# Patient Record
Sex: FEMALE | Race: BLACK OR AFRICAN AMERICAN | NOT HISPANIC OR LATINO | Employment: FULL TIME | URBAN - METROPOLITAN AREA
[De-identification: names, ages, dates, MRNs, and addresses within clinical notes are randomized per-mention and may not be internally consistent; named-entity substitution may affect disease eponyms.]

---

## 2021-10-29 ENCOUNTER — OCCUPATIONAL HEALTH (OUTPATIENT)
Dept: URGENT CARE | Facility: CLINIC | Age: 41
End: 2021-10-29

## 2021-10-29 PROCEDURE — 80305 DRUG TEST PRSMV DIR OPT OBS: CPT | Mod: S$GLB,,, | Performed by: EMERGENCY MEDICINE

## 2021-10-29 PROCEDURE — 80305 PR COLLECTION ONLY DRUG SCREEN: ICD-10-PCS | Mod: S$GLB,,, | Performed by: EMERGENCY MEDICINE

## 2023-01-19 ENCOUNTER — OFFICE VISIT (OUTPATIENT)
Dept: FAMILY MEDICINE | Facility: CLINIC | Age: 43
End: 2023-01-19
Payer: OTHER GOVERNMENT

## 2023-01-19 VITALS
WEIGHT: 117 LBS | HEIGHT: 63 IN | HEART RATE: 66 BPM | DIASTOLIC BLOOD PRESSURE: 74 MMHG | SYSTOLIC BLOOD PRESSURE: 122 MMHG | BODY MASS INDEX: 20.73 KG/M2 | OXYGEN SATURATION: 99 %

## 2023-01-19 DIAGNOSIS — L70.8 OTHER ACNE: Primary | ICD-10-CM

## 2023-01-19 DIAGNOSIS — Z12.31 SCREENING MAMMOGRAM, ENCOUNTER FOR: ICD-10-CM

## 2023-01-19 DIAGNOSIS — Z13.89 SCREENING FOR BLOOD OR PROTEIN IN URINE: ICD-10-CM

## 2023-01-19 DIAGNOSIS — Z13.6 SCREENING FOR ISCHEMIC HEART DISEASE (IHD): ICD-10-CM

## 2023-01-19 DIAGNOSIS — Z79.899 LONG-TERM USE OF HIGH-RISK MEDICATION: ICD-10-CM

## 2023-01-19 DIAGNOSIS — Z13.29 SCREENING FOR ENDOCRINE DISORDER: ICD-10-CM

## 2023-01-19 DIAGNOSIS — Z01.419 WELL FEMALE EXAM WITH ROUTINE GYNECOLOGICAL EXAM: ICD-10-CM

## 2023-01-19 PROCEDURE — 99203 OFFICE O/P NEW LOW 30 MIN: CPT | Mod: S$GLB,,, | Performed by: FAMILY MEDICINE

## 2023-01-19 PROCEDURE — 99203 PR OFFICE/OUTPT VISIT, NEW, LEVL III, 30-44 MIN: ICD-10-PCS | Mod: S$GLB,,, | Performed by: FAMILY MEDICINE

## 2023-01-19 NOTE — PROGRESS NOTES
SUBJECTIVE:    Patient ID: Viji Elder is a 42 y.o. female.    Chief Complaint: Establish Care (No Rx's/mammo ordered/flu denied/ hormonal change concerns due to acne breakout x 4 months//dp)    Pt here to establish care.     Pt has been having breakouts of acne about 3 month ago.  Has tried a lot of different topical. Has been taking otc retinol, and BHA. CeraVe    Menses are regular. The last 3 months has been having pain with ovulation. Usually doesn't cramp. Last ovulation lasted for an hour.     Walks a lot at work.   Trouble falling asleep and staying asleep.  Has been having some hot flashes about 2 months. Has not missed a cycle.   ------------------------------------------------------------------  Never mammogram  Last pap smear was about 3 years, never abnormal      No visits with results within 6 Month(s) from this visit.   Latest known visit with results is:   No results found for any previous visit.       History reviewed. No pertinent past medical history.  Social History     Socioeconomic History    Marital status: Single   Tobacco Use    Smoking status: Never     Passive exposure: Never    Smokeless tobacco: Never   Substance and Sexual Activity    Alcohol use: Not Currently    Drug use: Never    Sexual activity: Yes     Partners: Male     Birth control/protection: None     Past Surgical History:   Procedure Laterality Date     SECTION WITH TUBAL LIGATION      1994& 2010(BTL)     Family History   Problem Relation Age of Onset    Hypertension Mother     Cancer Father         prostate cancer    No Known Problems Sister     No Known Problems Brother     Eczema Daughter         2 daughters    Eczema Son        Review of patient's allergies indicates:  No Known Allergies  No current outpatient medications on file.    Review of Systems   Constitutional:  Negative for appetite change, fatigue, fever and unexpected weight change.   Respiratory:  Negative for cough, chest  "tightness, shortness of breath and wheezing.    Cardiovascular:  Negative for chest pain and leg swelling.   Gastrointestinal:  Negative for abdominal pain, constipation, nausea and vomiting.        -heartburn   Genitourinary:  Negative for difficulty urinating, dysuria, frequency and urgency.   Musculoskeletal:  Negative for arthralgias, back pain, myalgias and neck pain.   Skin:  Negative for rash.        +acne   Allergic/Immunologic: Positive for environmental allergies (takes otc antihistamine).   Neurological:  Negative for dizziness, weakness, numbness and headaches.   Hematological:  Does not bruise/bleed easily.   Psychiatric/Behavioral:  Negative for dysphoric mood, sleep disturbance and suicidal ideas. The patient is not nervous/anxious.    All other systems reviewed and are negative.       Objective:      Vitals:    01/19/23 1416   BP: 122/74   Pulse: 66   SpO2: 99%   Weight: 53.1 kg (117 lb)   Height: 5' 2.5" (1.588 m)     Physical Exam  Vitals reviewed.   Constitutional:       General: She is not in acute distress.     Appearance: Normal appearance. She is well-developed.   HENT:      Head: Normocephalic and atraumatic.   Neck:      Thyroid: No thyromegaly.   Cardiovascular:      Rate and Rhythm: Normal rate and regular rhythm.      Heart sounds: Normal heart sounds. No murmur heard.    No friction rub.   Pulmonary:      Effort: Pulmonary effort is normal.      Breath sounds: Normal breath sounds. No wheezing or rales.   Abdominal:      General: Bowel sounds are normal. There is no distension.      Palpations: Abdomen is soft.      Tenderness: There is no abdominal tenderness.   Musculoskeletal:      Cervical back: Neck supple.   Lymphadenopathy:      Cervical: No cervical adenopathy.   Skin:     General: Skin is warm and dry.      Findings: No rash.   Neurological:      Mental Status: She is alert and oriented to person, place, and time.   Psychiatric:         Attention and Perception: She is " attentive.         Speech: Speech normal.         Behavior: Behavior normal.         Thought Content: Thought content normal.         Judgment: Judgment normal.         Assessment:       1. Other acne    2. Screening mammogram, encounter for    3. Well female exam with routine gynecological exam    4. Long-term use of high-risk medication    5. Screening for blood or protein in urine    6. Screening for ischemic heart disease (IHD)    7. Screening for endocrine disorder         Plan:       Other acne  Comments:  Chronic. Will refer to Derm  Orders:  -     Ambulatory referral/consult to Dermatology; Future; Expected date: 01/26/2023    Screening mammogram, encounter for  Comments:  Mammogram order sent to [facility]  Orders:  -     Mammo Digital Screening Bilat; Future; Expected date: 01/19/2023    Well female exam with routine gynecological exam  Comments:  Will refer to GYN  Orders:  -     Ambulatory referral/consult to Obstetrics / Gynecology; Future; Expected date: 01/26/2023    Long-term use of high-risk medication  -     Lipid Panel; Future; Expected date: 01/19/2023  -     Comprehensive Metabolic Panel; Future; Expected date: 01/19/2023  -     Urinalysis, Reflex to Urine Culture Urine, Clean Catch; Future; Expected date: 01/19/2023  -     TSH w/reflex to FT4; Future; Expected date: 01/19/2023  -     CBC Auto Differential; Future; Expected date: 01/19/2023    Screening for blood or protein in urine  -     Urinalysis, Reflex to Urine Culture Urine, Clean Catch; Future; Expected date: 01/19/2023    Screening for ischemic heart disease (IHD)  -     Lipid Panel; Future; Expected date: 01/19/2023  -     Comprehensive Metabolic Panel; Future; Expected date: 01/19/2023    Screening for endocrine disorder  -     TSH w/reflex to FT4; Future; Expected date: 01/19/2023    Labs and/or tests have been ordered for the evaluation/monitoring of acute/chronic conditions, to be done just before next visit.    Follow up in about 1 year  (around 1/19/2024) for Annual.        1/19/2023 Karla Marques

## 2023-01-19 NOTE — PATIENT INSTRUCTIONS
Damir Merino MD  [address] [facility]  [facility] [place]  [place] [address]  Phone: [phone]  Fax: [phone]

## 2023-02-06 ENCOUNTER — HOSPITAL ENCOUNTER (OUTPATIENT)
Dept: RADIOLOGY | Facility: HOSPITAL | Age: 43
Discharge: HOME | End: 2023-02-06
Attending: FAMILY MEDICINE
Payer: OTHER GOVERNMENT

## 2023-02-06 DIAGNOSIS — Z12.31 SCREENING MAMMOGRAM, ENCOUNTER FOR: ICD-10-CM

## 2023-02-06 PROCEDURE — 77067 SCR MAMMO BI INCL CAD: CPT | Mod: TC,PO

## 2023-02-15 LAB
PAP RECOMMENDATION EXT: NORMAL
PAP SMEAR: NORMAL

## 2024-01-22 ENCOUNTER — OFFICE VISIT (OUTPATIENT)
Dept: FAMILY MEDICINE | Facility: CLINIC | Age: 44
End: 2024-01-22
Payer: OTHER GOVERNMENT

## 2024-01-22 VITALS
HEART RATE: 58 BPM | DIASTOLIC BLOOD PRESSURE: 60 MMHG | WEIGHT: 116 LBS | BODY MASS INDEX: 20.55 KG/M2 | SYSTOLIC BLOOD PRESSURE: 94 MMHG | HEIGHT: 63 IN

## 2024-01-22 DIAGNOSIS — Z00.00 ANNUAL PHYSICAL EXAM: Primary | ICD-10-CM

## 2024-01-22 DIAGNOSIS — M25.551 CHRONIC RIGHT HIP PAIN: ICD-10-CM

## 2024-01-22 DIAGNOSIS — Z12.11 SCREENING FOR MALIGNANT NEOPLASM OF COLON: ICD-10-CM

## 2024-01-22 DIAGNOSIS — Z12.31 OTHER SCREENING MAMMOGRAM: ICD-10-CM

## 2024-01-22 DIAGNOSIS — D17.1 LIPOMA OF TORSO: ICD-10-CM

## 2024-01-22 DIAGNOSIS — G89.29 CHRONIC RIGHT HIP PAIN: ICD-10-CM

## 2024-01-22 PROCEDURE — 99396 PREV VISIT EST AGE 40-64: CPT | Mod: S$GLB,,, | Performed by: FAMILY MEDICINE

## 2024-01-22 RX ORDER — MULTIVITAMIN
1 TABLET ORAL DAILY
COMMUNITY

## 2024-01-22 NOTE — PROGRESS NOTES
SUBJECTIVE:    Patient ID: Viji Elder is a 43 y.o. female.    Chief Complaint: Annual Exam (Annual wellness exam//no med bottles//complains of right hip pain//complains of soft lump tissue under  right shoulder blade//ref to gastroenterologist for colonoscopy made//mammogram ordered//declined flu vac//tc)    Pt here for annual exam.     Her right hip hurts every now and then when stands up a lot, more than 10 hours.  Works at a lab, for long hours. Sometimes she is able to sit. Can also be doing some bending and squatting. Also sleeps on that side most of the night as well. Hurts on the greater trochanter.       Has a  fatty tissue over her right scapula that is bothersome at times, but not to the point that she would like to do surgery.     Acne is better since last visit. Has been using retinol and a moisturizer.      Sleep is better.       ------------------------------------------------------------------  Mammogram 2023  Last pap smear was about 3 years, never abnormal        No visits with results within 6 Month(s) from this visit.   Latest known visit with results is:   No results found for any previous visit.       History reviewed. No pertinent past medical history.  Social History     Socioeconomic History    Marital status:    Tobacco Use    Smoking status: Never     Passive exposure: Never    Smokeless tobacco: Never   Substance and Sexual Activity    Alcohol use: Not Currently    Drug use: Never    Sexual activity: Yes     Partners: Male     Birth control/protection: None     Past Surgical History:   Procedure Laterality Date     SECTION WITH TUBAL LIGATION      1994& 2010(BTL)     Family History   Problem Relation Age of Onset    Hypertension Mother     Cancer Father         prostate cancer    No Known Problems Sister     No Known Problems Brother     Eczema Daughter         2 daughters    Eczema Son        Review of patient's allergies indicates:  No Known  "Allergies    Current Outpatient Medications:     multivitamin (THERAGRAN) per tablet, Take 1 tablet by mouth once daily., Disp: , Rfl:     Review of Systems   Constitutional:  Negative for appetite change, fatigue, fever and unexpected weight change.   Respiratory:  Negative for cough, chest tightness, shortness of breath and wheezing.    Cardiovascular:  Negative for chest pain and leg swelling.   Gastrointestinal:  Negative for abdominal pain, constipation, nausea and vomiting.        -heartburn   Genitourinary:  Negative for difficulty urinating, dysuria, frequency and urgency.   Musculoskeletal:  Positive for arthralgias. Negative for back pain, myalgias and neck pain.   Skin:  Negative for rash.        +acne, right scapular mass   Allergic/Immunologic: Negative for environmental allergies.   Neurological:  Negative for dizziness, weakness, numbness and headaches.   Hematological:  Does not bruise/bleed easily.   Psychiatric/Behavioral:  Negative for dysphoric mood, sleep disturbance and suicidal ideas. The patient is not nervous/anxious.    All other systems reviewed and are negative.         Objective:      Vitals:    01/22/24 0829   BP: 94/60   Pulse: (!) 58   Weight: 52.6 kg (116 lb)   Height: 5' 2.5" (1.588 m)       Physical Exam  Vitals reviewed.   Constitutional:       General: She is not in acute distress.     Appearance: Normal appearance. She is well-developed.   HENT:      Head: Normocephalic and atraumatic.   Neck:      Thyroid: No thyromegaly.   Cardiovascular:      Rate and Rhythm: Normal rate and regular rhythm.      Heart sounds: Normal heart sounds. No murmur heard.     No friction rub.   Pulmonary:      Effort: Pulmonary effort is normal.      Breath sounds: Normal breath sounds. No wheezing or rales.   Abdominal:      General: Bowel sounds are normal. There is no distension.      Palpations: Abdomen is soft.      Tenderness: There is no abdominal tenderness.   Musculoskeletal:      Cervical " back: Neck supple.   Lymphadenopathy:      Cervical: No cervical adenopathy.   Skin:     General: Skin is warm and dry.      Findings: No rash.   Neurological:      Mental Status: She is alert and oriented to person, place, and time.   Psychiatric:         Attention and Perception: She is attentive.         Speech: Speech normal.         Behavior: Behavior normal.         Thought Content: Thought content normal.         Judgment: Judgment normal.           Assessment:       1. Annual physical exam    2. Lipoma of torso    3. Screening for malignant neoplasm of colon    4. Other screening mammogram    5. Chronic right hip pain           Plan:       Annual physical exam    Lipoma of torso  Comments:  Will continue to monitor symptoms    Screening for malignant neoplasm of colon  Comments:  Referred to GI  Orders:  -     Ambulatory referral/consult to Gastroenterology; Future; Expected date: 01/29/2024    Other screening mammogram  Comments:  Order sent SMI  Orders:  -     Mammo Digital Screening Bilat w/ Matt; Future; Expected date: 01/22/2024    Chronic right hip pain  Comments:  Advised to try not to sleep on right hip. Can take ibuprofen prn      Labs and/or tests have been ordered for the evaluation/monitoring of acute/chronic conditions, to be done just before next visit.    Counseled on age and gender appropriate medical preventative services, including cancer screenings, immunizations, overall nutritional health, need for a consistent exercise regimen and an overall push towards maintaining a vigorous and active lifestyle    Follow up in about 1 year (around 1/22/2025) for Annual.        1/22/2024 Karla Marques

## 2024-02-12 ENCOUNTER — TELEPHONE (OUTPATIENT)
Dept: FAMILY MEDICINE | Facility: CLINIC | Age: 44
End: 2024-02-12
Payer: OTHER GOVERNMENT

## 2024-02-12 ENCOUNTER — HOSPITAL ENCOUNTER (OUTPATIENT)
Dept: RADIOLOGY | Facility: HOSPITAL | Age: 44
Discharge: HOME | End: 2024-02-12
Attending: FAMILY MEDICINE
Payer: OTHER GOVERNMENT

## 2024-02-12 VITALS — BODY MASS INDEX: 20.55 KG/M2 | WEIGHT: 116 LBS | HEIGHT: 63 IN

## 2024-02-12 DIAGNOSIS — Z13.6 SCREENING FOR ISCHEMIC HEART DISEASE (IHD): ICD-10-CM

## 2024-02-12 DIAGNOSIS — Z00.00 ANNUAL PHYSICAL EXAM: Primary | ICD-10-CM

## 2024-02-12 DIAGNOSIS — Z12.31 OTHER SCREENING MAMMOGRAM: ICD-10-CM

## 2024-02-12 DIAGNOSIS — Z13.89 SCREENING FOR BLOOD OR PROTEIN IN URINE: ICD-10-CM

## 2024-02-12 DIAGNOSIS — Z79.899 LONG-TERM USE OF HIGH-RISK MEDICATION: ICD-10-CM

## 2024-02-12 DIAGNOSIS — Z13.29 SCREENING FOR ENDOCRINE DISORDER: ICD-10-CM

## 2024-02-12 PROCEDURE — 77063 BREAST TOMOSYNTHESIS BI: CPT | Mod: TC,PO

## 2024-02-14 LAB
ALBUMIN SERPL-MCNC: 4.1 G/DL (ref 3.6–5.1)
ALBUMIN/GLOB SERPL: 1.4 (CALC) (ref 1–2.5)
ALP SERPL-CCNC: 48 U/L (ref 31–125)
ALT SERPL-CCNC: 10 U/L (ref 6–29)
APPEARANCE UR: CLEAR
AST SERPL-CCNC: 19 U/L (ref 10–35)
BACTERIA #/AREA URNS HPF: ABNORMAL /HPF
BACTERIA UR CULT: ABNORMAL
BACTERIA UR CULT: ABNORMAL
BASOPHILS # BLD AUTO: 39 CELLS/UL (ref 0–200)
BASOPHILS NFR BLD AUTO: 0.8 %
BILIRUB SERPL-MCNC: 0.5 MG/DL (ref 0.2–1.2)
BILIRUB UR QL STRIP: NEGATIVE
BUN SERPL-MCNC: 12 MG/DL (ref 7–25)
BUN/CREAT SERPL: NORMAL (CALC) (ref 6–22)
CALCIUM SERPL-MCNC: 9.1 MG/DL (ref 8.6–10.2)
CHLORIDE SERPL-SCNC: 105 MMOL/L (ref 98–110)
CHOLEST SERPL-MCNC: 184 MG/DL
CHOLEST/HDLC SERPL: 3.2 (CALC)
CO2 SERPL-SCNC: 27 MMOL/L (ref 20–32)
COLOR UR: YELLOW
CREAT SERPL-MCNC: 0.68 MG/DL (ref 0.5–0.99)
EGFR: 109 ML/MIN/1.73M2
EOSINOPHIL # BLD AUTO: 88 CELLS/UL (ref 15–500)
EOSINOPHIL NFR BLD AUTO: 1.8 %
ERYTHROCYTE [DISTWIDTH] IN BLOOD BY AUTOMATED COUNT: 12.3 % (ref 11–15)
GLOBULIN SER CALC-MCNC: 3 G/DL (CALC) (ref 1.9–3.7)
GLUCOSE SERPL-MCNC: 73 MG/DL (ref 65–99)
GLUCOSE UR QL STRIP: NEGATIVE
HCT VFR BLD AUTO: 37.5 % (ref 35–45)
HDLC SERPL-MCNC: 58 MG/DL
HGB BLD-MCNC: 12.6 G/DL (ref 11.7–15.5)
HGB UR QL STRIP: NEGATIVE
HYALINE CASTS #/AREA URNS LPF: ABNORMAL /LPF
KETONES UR QL STRIP: NEGATIVE
LDLC SERPL CALC-MCNC: 109 MG/DL (CALC)
LEUKOCYTE ESTERASE UR QL STRIP: ABNORMAL
LYMPHOCYTES # BLD AUTO: 1392 CELLS/UL (ref 850–3900)
LYMPHOCYTES NFR BLD AUTO: 28.4 %
MCH RBC QN AUTO: 29.4 PG (ref 27–33)
MCHC RBC AUTO-ENTMCNC: 33.6 G/DL (ref 32–36)
MCV RBC AUTO: 87.6 FL (ref 80–100)
MONOCYTES # BLD AUTO: 652 CELLS/UL (ref 200–950)
MONOCYTES NFR BLD AUTO: 13.3 %
NEUTROPHILS # BLD AUTO: 2729 CELLS/UL (ref 1500–7800)
NEUTROPHILS NFR BLD AUTO: 55.7 %
NITRITE UR QL STRIP: NEGATIVE
NONHDLC SERPL-MCNC: 126 MG/DL (CALC)
PH UR STRIP: 7 [PH] (ref 5–8)
PLATELET # BLD AUTO: 258 THOUSAND/UL (ref 140–400)
PMV BLD REES-ECKER: 11.1 FL (ref 7.5–12.5)
POTASSIUM SERPL-SCNC: 4.2 MMOL/L (ref 3.5–5.3)
PROT SERPL-MCNC: 7.1 G/DL (ref 6.1–8.1)
PROT UR QL STRIP: NEGATIVE
RBC # BLD AUTO: 4.28 MILLION/UL (ref 3.8–5.1)
RBC #/AREA URNS HPF: ABNORMAL /HPF
SERVICE CMNT-IMP: ABNORMAL
SODIUM SERPL-SCNC: 141 MMOL/L (ref 135–146)
SP GR UR STRIP: 1.01 (ref 1–1.03)
SQUAMOUS #/AREA URNS HPF: ABNORMAL /HPF
TRIGL SERPL-MCNC: 76 MG/DL
TSH SERPL-ACNC: 1.06 MIU/L
WBC # BLD AUTO: 4.9 THOUSAND/UL (ref 3.8–10.8)
WBC #/AREA URNS HPF: ABNORMAL /HPF

## 2024-02-15 ENCOUNTER — TELEPHONE (OUTPATIENT)
Dept: FAMILY MEDICINE | Facility: CLINIC | Age: 44
End: 2024-02-15
Payer: OTHER GOVERNMENT

## 2024-02-15 RX ORDER — SULFAMETHOXAZOLE AND TRIMETHOPRIM 800; 160 MG/1; MG/1
1 TABLET ORAL 2 TIMES DAILY
Qty: 6 TABLET | Refills: 0 | Status: SHIPPED | OUTPATIENT
Start: 2024-02-15 | End: 2024-02-18

## 2024-02-15 NOTE — TELEPHONE ENCOUNTER
Spoke with patient notified of UTI results per dr perez.   Patient verbalized understanding, informed to return call with questions/concerns -DN

## 2024-02-20 LAB — CRC RECOMMENDATION EXT: NORMAL

## 2024-02-23 ENCOUNTER — PATIENT OUTREACH (OUTPATIENT)
Dept: ADMINISTRATIVE | Facility: HOSPITAL | Age: 44
End: 2024-02-23
Payer: OTHER GOVERNMENT

## 2024-02-23 NOTE — PROGRESS NOTES
Population Health Chart Review & Patient Outreach Details    Outreach Performed: NO    Additional Pop Health Notes:           Updates Requested / Reviewed:      Updated Care Coordination Note, , External Sources: [facility] and [facility], Care Team Updated, and Immunizations Reconciliation Completed or Queried: [place]         Health Maintenance Topics Overdue:    Health Maintenance Due   Topic Date Due    Hepatitis C Screening  Never done    Cervical Cancer Screening  Never done    HIV Screening  Never done    Influenza Vaccine (1) Never done    COVID-19 Vaccine (3 - 2023-24 season) 09/01/2023         Health Maintenance Topic(s) Outreach Outcomes & Actions Taken:    Cervical Cancer Screening - Outreach Outcomes & Actions Taken  : External Records Uploaded & Care Team Updated if Applicable    Colorectal Cancer Screening - Outreach Outcomes & Actions Taken  : External Records Uploaded, Care Team Updated, & History Updated if Applicable

## 2024-03-25 ENCOUNTER — TELEPHONE (OUTPATIENT)
Dept: FAMILY MEDICINE | Facility: CLINIC | Age: 44
End: 2024-03-25
Payer: OTHER GOVERNMENT

## 2024-03-25 NOTE — TELEPHONE ENCOUNTER
----- Message from Georgina Hay sent at 3/25/2024 10:09 AM CDT -----  Pt has ringworm and needs to be seen asap,. Pt #[phone]

## 2024-03-27 ENCOUNTER — OFFICE VISIT (OUTPATIENT)
Dept: FAMILY MEDICINE | Facility: CLINIC | Age: 44
End: 2024-03-27
Payer: OTHER GOVERNMENT

## 2024-03-27 VITALS
HEIGHT: 63 IN | WEIGHT: 120 LBS | SYSTOLIC BLOOD PRESSURE: 98 MMHG | HEART RATE: 76 BPM | BODY MASS INDEX: 21.26 KG/M2 | DIASTOLIC BLOOD PRESSURE: 54 MMHG

## 2024-03-27 DIAGNOSIS — L30.9 DERMATITIS: Primary | ICD-10-CM

## 2024-03-27 PROCEDURE — 99212 OFFICE O/P EST SF 10 MIN: CPT | Mod: S$GLB,,, | Performed by: FAMILY MEDICINE

## 2024-03-27 RX ORDER — TRIAMCINOLONE ACETONIDE 1 MG/G
CREAM TOPICAL 2 TIMES DAILY
Qty: 80 G | Refills: 1 | Status: SHIPPED | OUTPATIENT
Start: 2024-03-27

## 2024-03-27 RX ORDER — KETOCONAZOLE 20 MG/G
CREAM TOPICAL DAILY
Qty: 80 G | Refills: 1 | Status: SHIPPED | OUTPATIENT
Start: 2024-03-27

## 2024-03-27 NOTE — PROGRESS NOTES
SUBJECTIVE:    Patient ID: Viji Elder is a 43 y.o. female.    Chief Complaint: possible ringworm (Complains of possible ringworm lower right leg for 3 weeks//no med bottles//tc)    Pt here for possible ringworm on right  lower leg.    Has tried putting clotrimazole and apple cider vinager (on a cotton ball taped with a bandaid)      It has been itching and she has been scratching it a lot.     Has had before and was treated with ketoconazole.   ------------------------------------------------------------------  Mammogram 2/2023  Last pap smear was about 3 years, never abnormal        Patient Outreach on 02/23/2024   Component Date Value Ref Range Status    CRC Recommendation External 02/20/2024 Repeat colonoscopy in 5 years   Final    PAP Recommendation External 02/15/2023 Pap in 3 years   Final    Pap 02/15/2023 Negative for intraephithelial lesion or malignancy  Negative for intraephithelial lesion or malignancy, Other Final   Telephone on 02/12/2024   Component Date Value Ref Range Status    Cholesterol 02/12/2024 184  <200 mg/dL Final    HDL 02/12/2024 58  > OR = 50 mg/dL Final    Triglycerides 02/12/2024 76  <150 mg/dL Final    LDL Cholesterol 02/12/2024 109 (H)  mg/dL (calc) Final    HDL/Cholesterol Ratio 02/12/2024 3.2  <5.0 (calc) Final    Non HDL Chol. (LDL+VLDL) 02/12/2024 126  <130 mg/dL (calc) Final    Glucose 02/12/2024 73  65 - 99 mg/dL Final    BUN 02/12/2024 12  7 - 25 mg/dL Final    Creatinine 02/12/2024 0.68  0.50 - 0.99 mg/dL Final    eGFR 02/12/2024 109  > OR = 60 mL/min/1.73m2 Final    BUN/Creatinine Ratio 02/12/2024 SEE NOTE:  6 - 22 (calc) Final    Sodium 02/12/2024 141  135 - 146 mmol/L Final    Potassium 02/12/2024 4.2  3.5 - 5.3 mmol/L Final    Chloride 02/12/2024 105  98 - 110 mmol/L Final    CO2 02/12/2024 27  20 - 32 mmol/L Final    Calcium 02/12/2024 9.1  8.6 - 10.2 mg/dL Final    Total Protein 02/12/2024 7.1  6.1 - 8.1 g/dL Final    Albumin 02/12/2024 4.1  3.6 - 5.1 g/dL  Final    Globulin, Total 02/12/2024 3.0  1.9 - 3.7 g/dL (calc) Final    Albumin/Globulin Ratio 02/12/2024 1.4  1.0 - 2.5 (calc) Final    Total Bilirubin 02/12/2024 0.5  0.2 - 1.2 mg/dL Final    Alkaline Phosphatase 02/12/2024 48  31 - 125 U/L Final    AST 02/12/2024 19  10 - 35 U/L Final    ALT 02/12/2024 10  6 - 29 U/L Final    WBC 02/12/2024 4.9  3.8 - 10.8 Thousand/uL Final    RBC 02/12/2024 4.28  3.80 - 5.10 Million/uL Final    Hemoglobin 02/12/2024 12.6  11.7 - 15.5 g/dL Final    Hematocrit 02/12/2024 37.5  35.0 - 45.0 % Final    MCV 02/12/2024 87.6  80.0 - 100.0 fL Final    MCH 02/12/2024 29.4  27.0 - 33.0 pg Final    MCHC 02/12/2024 33.6  32.0 - 36.0 g/dL Final    RDW 02/12/2024 12.3  11.0 - 15.0 % Final    Platelets 02/12/2024 258  140 - 400 Thousand/uL Final    MPV 02/12/2024 11.1  7.5 - 12.5 fL Final    Neutrophils, Abs 02/12/2024 2,729  1,500 - 7,800 cells/uL Final    Lymph # 02/12/2024 1,392  850 - 3,900 cells/uL Final    Mono # 02/12/2024 652  200 - 950 cells/uL Final    Eos # 02/12/2024 88  15 - 500 cells/uL Final    Baso # 02/12/2024 39  0 - 200 cells/uL Final    Neutrophils Relative 02/12/2024 55.7  % Final    Lymph % 02/12/2024 28.4  % Final    Mono % 02/12/2024 13.3  % Final    Eosinophil % 02/12/2024 1.8  % Final    Basophil % 02/12/2024 0.8  % Final    TSH w/reflex to FT4 02/12/2024 1.06  mIU/L Final    Color, UA 02/12/2024 YELLOW  YELLOW Final    Appearance, UA 02/12/2024 CLEAR  CLEAR Final    Specific Gravity, UA 02/12/2024 1.013  1.001 - 1.035 Final    pH, UA 02/12/2024 7.0  5.0 - 8.0 Final    Glucose, UA 02/12/2024 NEGATIVE  NEGATIVE Final    Bilirubin, UA 02/12/2024 NEGATIVE  NEGATIVE Final    Ketones, UA 02/12/2024 NEGATIVE  NEGATIVE Final    Occult Blood UA 02/12/2024 NEGATIVE  NEGATIVE Final    Protein, UA 02/12/2024 NEGATIVE  NEGATIVE Final    Nitrite, UA 02/12/2024 NEGATIVE  NEGATIVE Final    Leukocytes, UA 02/12/2024 1+ (A)  NEGATIVE Final    WBC Casts, UA 02/12/2024 10-20 (A)  < OR =  5 /HPF Final    RBC Casts, UA 2024 NONE SEEN  < OR = 2 /HPF Final    Squam Epithel, UA 2024 6-10 (A)  < OR = 5 /HPF Final    Bacteria, UA 2024 MANY (A)  NONE SEEN /HPF Final    Hyaline Casts, UA 2024 NONE SEEN  NONE SEEN /LPF Final    Service Cmt: 2024    Final    Reflexive Urine Culture 2024    Final    Urine Culture, Routine 2024  (A)   Final       Past Medical History:   Diagnosis Date    Personal history of colonic polyps 2024    Matthew Acosta MD     Social History     Socioeconomic History    Marital status:    Tobacco Use    Smoking status: Never     Passive exposure: Never    Smokeless tobacco: Never   Substance and Sexual Activity    Alcohol use: Not Currently    Drug use: Never    Sexual activity: Yes     Partners: Male     Birth control/protection: None     Past Surgical History:   Procedure Laterality Date     SECTION WITH TUBAL LIGATION      1994& 2010(BTL)    COLONOSCOPY  2024    Matthew Acosta MD     Family History   Problem Relation Age of Onset    Hypertension Mother     Cancer Father         prostate cancer    No Known Problems Sister     Eczema Daughter         2 daughters    Breast cancer Paternal Aunt     No Known Problems Brother     Eczema Son        Review of patient's allergies indicates:  No Known Allergies    Current Outpatient Medications:     multivitamin (THERAGRAN) per tablet, Take 1 tablet by mouth once daily., Disp: , Rfl:     ketoconazole (NIZORAL) 2 % cream, Apply topically once daily., Disp: 80 g, Rfl: 1    triamcinolone acetonide 0.1% (KENALOG) 0.1 % cream, Apply topically 2 (two) times daily., Disp: 80 g, Rfl: 1    Review of Systems   Constitutional:  Negative for appetite change, fatigue, fever and unexpected weight change.   Respiratory:  Negative for cough, chest tightness, shortness of breath and wheezing.    Cardiovascular:  Negative for chest pain and leg swelling.   Gastrointestinal:  Negative for  "abdominal pain, constipation, nausea and vomiting.        -heartburn   Genitourinary:  Negative for difficulty urinating, dysuria, frequency and urgency.   Musculoskeletal:  Negative for arthralgias, back pain, myalgias and neck pain.   Skin:  Positive for rash.   Allergic/Immunologic: Negative for environmental allergies.   Neurological:  Negative for dizziness, weakness, numbness and headaches.   Hematological:  Does not bruise/bleed easily.   Psychiatric/Behavioral:  Negative for dysphoric mood, sleep disturbance and suicidal ideas. The patient is not nervous/anxious.    All other systems reviewed and are negative.         Objective:      Vitals:    03/27/24 0815   BP: (!) 98/54   Pulse: 76   Weight: 54.4 kg (120 lb)   Height: 5' 2.5" (1.588 m)         Physical Exam  Vitals reviewed.   Constitutional:       General: She is not in acute distress.     Appearance: Normal appearance. She is well-developed.   HENT:      Head: Normocephalic and atraumatic.   Neck:      Thyroid: No thyromegaly.   Cardiovascular:      Rate and Rhythm: Normal rate and regular rhythm.      Heart sounds: Normal heart sounds. No murmur heard.     No friction rub.   Pulmonary:      Effort: Pulmonary effort is normal.      Breath sounds: Normal breath sounds. No wheezing or rales.   Abdominal:      General: Bowel sounds are normal. There is no distension.      Palpations: Abdomen is soft.      Tenderness: There is no abdominal tenderness.   Musculoskeletal:      Cervical back: Neck supple.   Lymphadenopathy:      Cervical: No cervical adenopathy.   Skin:     General: Skin is warm and dry.      Findings: Rash present. Rash is macular and scaling.          Neurological:      Mental Status: She is alert and oriented to person, place, and time.   Psychiatric:         Attention and Perception: She is attentive.         Speech: Speech normal.         Behavior: Behavior normal.         Thought Content: Thought content normal.         Judgment: " Judgment normal.           Assessment:       1. Dermatitis             Plan:       Dermatitis  Comments:  Acute. Will continue to monitor symptoms.  Orders:  -     ketoconazole (NIZORAL) 2 % cream; Apply topically once daily.  Dispense: 80 g; Refill: 1  -     triamcinolone acetonide 0.1% (KENALOG) 0.1 % cream; Apply topically 2 (two) times daily.  Dispense: 80 g; Refill: 1          Follow up if symptoms worsen or fail to improve, for As scheduled.        3/27/2024 Karla Marques

## 2024-05-09 ENCOUNTER — TELEPHONE (OUTPATIENT)
Dept: FAMILY MEDICINE | Facility: CLINIC | Age: 44
End: 2024-05-09
Payer: OTHER GOVERNMENT

## 2024-05-09 ENCOUNTER — PATIENT MESSAGE (OUTPATIENT)
Dept: FAMILY MEDICINE | Facility: CLINIC | Age: 44
End: 2024-05-09
Payer: OTHER GOVERNMENT

## 2024-05-09 DIAGNOSIS — L30.9 DERMATITIS: Primary | ICD-10-CM

## 2024-05-09 NOTE — TELEPHONE ENCOUNTER
Let pt know that I do not think that is ringworm. She may need to see Derm about this lesion.  Can send to Dr. Weil.

## 2024-05-09 NOTE — TELEPHONE ENCOUNTER
----- Message from Ciera Morris sent at 5/9/2024 11:23 AM CDT -----  Pt would like to be seen tomorrow due to wing worm on her leg that has been there for a minute. Pt was prescribed cream before but not working for pt.   [phone]

## 2024-05-09 NOTE — TELEPHONE ENCOUNTER
----- Message from Carin Alcala sent at 5/9/2024 12:52 PM CDT -----  The patient wants to make sure you received the image on her portal.  Please call pt's # [phone]

## 2024-07-23 ENCOUNTER — TELEPHONE (OUTPATIENT)
Dept: FAMILY MEDICINE | Facility: CLINIC | Age: 44
End: 2024-07-23
Payer: OTHER GOVERNMENT

## 2024-07-23 NOTE — TELEPHONE ENCOUNTER
----- Message from Ciera Morris sent at 7/23/2024 11:33 AM CDT -----  Pt was trying to get an appointment this week due to having a UTI.   [phone]

## 2024-07-23 NOTE — TELEPHONE ENCOUNTER
Spoke with pt advised no appts, but sen schedule her for [place]. Pt states she is not able to get off work now, but can come tomorrow.

## 2024-07-24 ENCOUNTER — CLINICAL SUPPORT (OUTPATIENT)
Dept: FAMILY MEDICINE | Facility: CLINIC | Age: 44
End: 2024-07-24
Payer: OTHER GOVERNMENT

## 2024-07-24 DIAGNOSIS — R39.9 UTI SYMPTOMS: Primary | ICD-10-CM

## 2024-07-24 LAB
BILIRUB UR QL STRIP: NEGATIVE
GLUCOSE UR QL STRIP: NEGATIVE
KETONES UR QL STRIP: NEGATIVE
LEUKOCYTE ESTERASE UR QL STRIP: NEGATIVE
PH, POC UA: 6
POC BLOOD, URINE: NEGATIVE
POC NITRATES, URINE: NEGATIVE
PROT UR QL STRIP: NEGATIVE
SP GR UR STRIP: 1.01 (ref 1–1.03)
UROBILINOGEN UR STRIP-ACNC: NORMAL (ref 0.1–1.1)

## 2024-07-24 PROCEDURE — 81003 URINALYSIS AUTO W/O SCOPE: CPT | Mod: QW,S$GLB,, | Performed by: FAMILY MEDICINE

## 2024-07-27 LAB — BACTERIA UR CULT: NORMAL

## 2024-07-29 ENCOUNTER — TELEPHONE (OUTPATIENT)
Dept: FAMILY MEDICINE | Facility: CLINIC | Age: 44
End: 2024-07-29
Payer: OTHER GOVERNMENT

## 2024-07-29 NOTE — TELEPHONE ENCOUNTER
----- Message from Karla Marques MD sent at 7/28/2024  1:39 PM CDT -----  UA NO UTI- Let pt know that urinalysis showed no evidence of an infection.  Her urine culture showed no growth.

## 2024-07-29 NOTE — TELEPHONE ENCOUNTER
Spoke to patient with result verbatim per Dr Marques. Verbalized understanding and said she saw these results on the portal.

## 2024-08-21 ENCOUNTER — OFFICE VISIT (OUTPATIENT)
Dept: URGENT CARE | Facility: CLINIC | Age: 44
End: 2024-08-21
Payer: OTHER GOVERNMENT

## 2024-08-21 VITALS
RESPIRATION RATE: 18 BRPM | HEIGHT: 63 IN | SYSTOLIC BLOOD PRESSURE: 105 MMHG | OXYGEN SATURATION: 99 % | DIASTOLIC BLOOD PRESSURE: 67 MMHG | WEIGHT: 110 LBS | BODY MASS INDEX: 19.49 KG/M2 | TEMPERATURE: 98 F | HEART RATE: 67 BPM

## 2024-08-21 DIAGNOSIS — N39.0 URINARY TRACT INFECTION WITHOUT HEMATURIA, SITE UNSPECIFIED: Primary | ICD-10-CM

## 2024-08-21 LAB
B-HCG UR QL: NEGATIVE
BILIRUB UR QL STRIP: NEGATIVE
CTP QC/QA: YES
GLUCOSE UR QL STRIP: NEGATIVE
KETONES UR QL STRIP: NEGATIVE
LEUKOCYTE ESTERASE UR QL STRIP: POSITIVE
PH, POC UA: 6
POC BLOOD, URINE: NEGATIVE
POC NITRATES, URINE: NEGATIVE
PROT UR QL STRIP: NEGATIVE
SP GR UR STRIP: 1.01 (ref 1–1.03)
UROBILINOGEN UR STRIP-ACNC: 0.2 (ref 0.1–1.1)

## 2024-08-21 PROCEDURE — 81025 URINE PREGNANCY TEST: CPT | Mod: S$GLB,,,

## 2024-08-21 PROCEDURE — 99204 OFFICE O/P NEW MOD 45 MIN: CPT | Mod: S$GLB,,,

## 2024-08-21 PROCEDURE — 81003 URINALYSIS AUTO W/O SCOPE: CPT | Mod: QW,S$GLB,,

## 2024-08-21 RX ORDER — NITROFURANTOIN 25; 75 MG/1; MG/1
100 CAPSULE ORAL 2 TIMES DAILY
Qty: 10 CAPSULE | Refills: 0 | Status: SHIPPED | OUTPATIENT
Start: 2024-08-21 | End: 2024-08-26

## 2024-08-21 NOTE — PROGRESS NOTES
"Subjective:      Patient ID: Viji Elder is a 44 y.o. female.    Vitals:  height is 5' 3" (1.6 m) and weight is 49.9 kg (110 lb). Her temperature is 98.2 °F (36.8 °C). Her blood pressure is 105/67 and her pulse is 67. Her respiration is 18 and oxygen saturation is 99%.     Chief Complaint: Urinary Tract Infection    In clinic with a chief complaint of urinary urgency, frequency, dysuria, and pelvic pain.  Symptoms been ongoing and waxing and waning for a month.  He was seen by her primary care doctor end of July and had a negative urine culture.  She denies being placed on antibiotics.  Also endorses no improvement in symptoms since then.  Denies vaginal discharge, or lesions.  She was tried cranberry, and zinc with no improvement.  She denies recurrent or frequent UTIs.  Prior to onset she was vacationing, and swimming.  But denies staying in wet swim suit bottoms.  She also reports previous UTI earlier in the year    Urinary Tract Infection   This is a new problem. The current episode started in the past 7 days. The problem occurs every urination. The problem has been gradually worsening. The quality of the pain is described as aching and burning. The pain is moderate. She is Not sexually active. There is No history of pyelonephritis. Associated symptoms include frequency and urgency. Pertinent negatives include no flank pain, nausea, vomiting or constipation. Associated symptoms comments: Abdominal pain. She has tried increased fluids for the symptoms. There is no history of diabetes insipidus, diabetes mellitus, kidney stones, recurrent UTIs or STD.       Constitution: Negative for fever.   HENT: Negative.     Neck: neck negative.   Cardiovascular: Negative.    Respiratory: Negative.     Gastrointestinal:  Negative for abdominal pain, nausea, vomiting, constipation and diarrhea.   Endocrine: negative.   Genitourinary:  Positive for dysuria, frequency, urgency and pelvic pain. Negative for flank pain. "   Musculoskeletal: Negative.    Skin: Negative.    Neurological: Negative.    Hematologic/Lymphatic: Negative.    Psychiatric/Behavioral: Negative.        Objective:     Physical Exam   Constitutional: She is oriented to person, place, and time. She is cooperative.  Non-toxic appearance. She does not appear ill. No distress.   HENT:   Head: Normocephalic and atraumatic.   Ears:   Right Ear: External ear normal.   Left Ear: External ear normal.   Nose: Nose normal.   Mouth/Throat: Uvula is midline, oropharynx is clear and moist and mucous membranes are normal. Mucous membranes are moist. Oropharynx is clear.   Eyes: Conjunctivae and lids are normal. Pupils are equal, round, and reactive to light. Extraocular movement intact   Neck: Trachea normal and phonation normal. Neck supple.   Cardiovascular: Normal rate, regular rhythm, normal heart sounds and normal pulses.   Pulmonary/Chest: Effort normal and breath sounds normal.   Abdominal: Normal appearance. Soft. flat abdomen There is no abdominal tenderness. There is no left CVA tenderness and no right CVA tenderness.   Musculoskeletal: Normal range of motion.         General: Normal range of motion.      Right lower leg: No edema.      Left lower leg: No edema.   Neurological: no focal deficit. She is alert, oriented to person, place, and time and at baseline. She has normal motor skills, normal sensation and intact cranial nerves (2-12). Gait and coordination normal. GCS eye subscore is 4. GCS verbal subscore is 5. GCS motor subscore is 6.   Skin: Skin is warm, dry and not diaphoretic. Capillary refill takes 2 to 3 seconds.   Psychiatric: She experiences Normal attention and Normal perception. Her speech is normal and behavior is normal. Mood, judgment and thought content normal.   Nursing note and vitals reviewed.      Assessment:     1. Urinary tract infection without hematuria, site unspecified        Plan:       Urinary tract infection without hematuria, site  unspecified  -     POCT urine pregnancy  -     POCT Urinalysis, Dipstick, Manual, W/O Scope  -     nitrofurantoin, macrocrystal-monohydrate, (MACROBID) 100 MG capsule; Take 1 capsule (100 mg total) by mouth 2 (two) times daily. for 5 days  Dispense: 10 capsule; Refill: 0  -     Urine culture      Trace leukocytes.  Symptoms have been ongoing for a month with no improvement.  Given duration of symptoms with no significant pyuria I have low suspicion of actual UTI, will , confirm with culture.  Suspect some degree of gyn problem, but she denies symptoms.  Previous cultures also reviewed.  Most recent when at onset of symptoms was negative.  Did have a positive culture in February 24 at PCP's office.

## 2024-08-21 NOTE — PATIENT INSTRUCTIONS
DETAILED PLAN: .   Preventative DAILY supplements:  Recommend Buying a Cranberry Supplement that has 36mg PAC (only a few have this much) of cranberry - it is a very specific dose but many companies sell it.   Preferred: Utiva Cranberry Tablets (Utiva Cranberry PACs Supplement Pills $39.99 for 30 pills)- their particular tablet is the equivalent of 9 cranberry tablets that you buy over the counter. (phone 1-313.940.4030 or online https://www.[employer]/products/utiva-uti-control)  Uriexo Cranberry Tablets   [employer] $30/month: https://[employer]/products/cranberex-urinary-health-support  If unable to afford- can use over the counter cranberry caplets but will need to take 1500mg daily (about 3 capsules, 3x a day)   Ok to Buy Over the Counter at [employer], [employer] (ask pharmacist for help) or buy from [employer] (see above)  Probiotic with lactobacillus - take once a day. This helps populate the vagina with good bacteria instead of bad bacteria- you can buy this over the counter or buy from the company [employer]. Make sure to look for LACTOBACILLUS on the bottle.   D-mannose supplement (2000mg a day)  $20/30d supply  This helps keep the bad bacteria from sticking to your bladder wall. You can buy this over the counter or buy from [employer] by visiting [employer].      UTI prevention recommendations  Drink more water (2 to 4 bottles) to dilute the bacteria that are replicating. This will also help you urinate more often if you tend to hold your bladder.   Timed voiding (which means- Urinate every 2 hours during the day) to rid the bladder of bacteria before they multiply and start to stick to your bladder walls and cause more symptoms and problems  Avoid pads if possible or wear thinner pads and change them more often     Other helpful information:     If you have a UTI try to self treat first for 1-2 days with conservative treatment:  Increase fluid intake - drink 4 to 5 bottles  "of water a day and empty bladder often  AZO for burning or urinary frequency (over the counter)  Utiva cranberry tablets when having uti symptoms: Take 2x a day for 2 days then daily for a week (buy from [employer]). Visit https://www.[facility].[employer] or call 1-785.665.1477 to order. They costs about $30/month and offer a subscribe and save option. They also have a probiotic and D mannose supplementation, if the patient is able to afford, I suggest taking. Utiva cranberry tablets only available from [employer] are equivalent to the suggested dose of 9 tablets if you buy over the counter.   D mannose 2000mx a day for 2 days then daily for a week (can buy from [employer] or over the counter)     If your symptoms persists despite increased water intake and azo then:  see pcp, gynecologist, or urgent care and make sure to give a clean catch urine or catheterized urine. This means wipe, urinate in the toilet, then provide a MID STREAM sample (your hand should get urine on it if you are doing it right). This avoids urine picking up the normal bacteria that line the vagina on the way out to the cup.   ask for a URINE CULTURE. You need to make sure you know what antibiotics will kill your particular bacteria  if you are told you have "multiple organisms" or "normal vaginal drew" it means the urine sample picked up the normal bacteria in the vagina and contaminated your urine specimen. If you are still having symptoms of a UTI then you will need to provide ANOTHER clean catch sample or CATHETERIZED urine to bypass the vagina and get urine directly from the bladder:      If you are given antibiotics from primary care, ER, urgent care or gynecologist:  only take 3 to 5 days of the antibiotics (unless you have diabetes or a urologist tells you take take more), even if they give you a 10d supply and keep or discard the rest  only take the full 10 days if you are having fever, chills or pain in your kidneys      Things to " "remember:   Try to avoid antibiotics or at least try to avoid taking them for more than 3 to 5 days for simple uti.    Bacteria are smart and they will become resistant to antibiotics. We have only a limited amount of antibiotics that work.   ALWAYS request a urine culture so you can know which antibiotics work.   If you ever see bloody red urine call us ASAP, even with uti's and even if you are on a blood thinner, this can sometimes be a sign of bladder cancer or kidney stones.      If you do have uti symptoms but do not have a culture proven uti (with E.coli, for example)  You may need a catheterized urine if it says "multiple organisms" which means normal vaginal drew  You may have a kidney stone, interstitial cystitis, overactive bladder, bladder cancer, so please call to make a follow-up      "

## 2024-10-11 ENCOUNTER — OFFICE VISIT (OUTPATIENT)
Dept: URGENT CARE | Facility: CLINIC | Age: 44
End: 2024-10-11
Payer: OTHER GOVERNMENT

## 2024-10-11 VITALS
BODY MASS INDEX: 20.38 KG/M2 | HEIGHT: 63 IN | OXYGEN SATURATION: 100 % | WEIGHT: 115 LBS | TEMPERATURE: 99 F | HEART RATE: 58 BPM | DIASTOLIC BLOOD PRESSURE: 77 MMHG | RESPIRATION RATE: 16 BRPM | SYSTOLIC BLOOD PRESSURE: 128 MMHG

## 2024-10-11 DIAGNOSIS — J01.00 ACUTE NON-RECURRENT MAXILLARY SINUSITIS: ICD-10-CM

## 2024-10-11 DIAGNOSIS — H66.92 ACUTE OTITIS MEDIA, LEFT: Primary | ICD-10-CM

## 2024-10-11 RX ORDER — FLUTICASONE PROPIONATE 50 MCG
2 SPRAY, SUSPENSION (ML) NASAL DAILY
Qty: 15.8 ML | Refills: 0 | Status: SHIPPED | OUTPATIENT
Start: 2024-10-11

## 2024-10-11 RX ORDER — LORATADINE PSEUDOEPHEDRINE SULFATE 10; 240 MG/1; MG/1
1 TABLET, EXTENDED RELEASE ORAL DAILY
COMMUNITY
Start: 2024-10-11 | End: 2024-10-21

## 2024-10-11 RX ORDER — AMOXICILLIN AND CLAVULANATE POTASSIUM 875; 125 MG/1; MG/1
1 TABLET, FILM COATED ORAL EVERY 12 HOURS
Qty: 20 TABLET | Refills: 0 | Status: SHIPPED | OUTPATIENT
Start: 2024-10-11 | End: 2024-10-21

## 2024-10-11 NOTE — PROGRESS NOTES
"Subjective:      Patient ID: Viji Elder is a 44 y.o. female.    Vitals:  height is 5' 3" (1.6 m) and weight is 52.2 kg (115 lb). Her oral temperature is 98.8 °F (37.1 °C). Her blood pressure is 128/77 and her pulse is 58 (abnormal). Her respiration is 16 and oxygen saturation is 100%.     Chief Complaint: Otalgia    Patient is a 44-year-old female who presents to clinic for evaluation of ear pain.  Patient reports symptoms began yesterday.  Patient reports she is vaccinated.  Patient reports no recent or known sick exposures.  Patient reports no over-the-counter medications for symptoms at this point.  Patient states having left ear pain.  Patient reports fullness type sensation.  Patient states no drainage from ear, ringing in the ear or any hearing loss.  Patient states has had associated symptoms of nasal sinus congestion with sinus pressure.  Patient states that she has not had any headaches or dizziness, sore throat, shortness of breath or cough, chest pain or palpitations, abdominal pain, nausea or vomiting, diarrhea, urinary symptoms, body aches, rash, fever or chills.    Otalgia   There is pain in the left ear. This is a new problem. The current episode started yesterday. The problem has been gradually worsening. Pertinent negatives include no abdominal pain, coughing, diarrhea, ear discharge, headaches, hearing loss, rash, sore throat or vomiting.       Constitution: Negative. Negative for chills, sweating, fatigue and fever.   HENT:  Positive for ear pain (Left ear fullness), congestion and sinus pressure. Negative for ear discharge, tinnitus, hearing loss and sore throat.    Neck: neck negative.   Cardiovascular: Negative.  Negative for chest pain and palpitations.   Eyes: Negative.    Respiratory: Negative.  Negative for chest tightness, cough and shortness of breath.    Gastrointestinal: Negative.  Negative for abdominal pain, nausea, vomiting and diarrhea.   Endocrine: negative. "   Genitourinary: Negative.  Negative for dysuria, frequency and urgency.   Musculoskeletal: Negative.    Skin: Negative.  Negative for color change, pale, rash and erythema.   Allergic/Immunologic: Negative.    Neurological: Negative.  Negative for dizziness, light-headedness, passing out, headaches, disorientation and altered mental status.   Hematologic/Lymphatic: Negative.    Psychiatric/Behavioral: Negative.  Negative for altered mental status, disorientation and confusion.       Objective:     Physical Exam   Constitutional: She is oriented to person, place, and time. She appears well-developed. She is cooperative.  Non-toxic appearance. She does not appear ill. No distress.   HENT:   Head: Normocephalic and atraumatic.   Ears:   Right Ear: Hearing, tympanic membrane, external ear and ear canal normal.   Left Ear: Hearing, external ear and ear canal normal. No no drainage, swelling or tenderness. Tympanic membrane is erythematous and bulging. Tympanic membrane is not perforated. No decreased hearing is noted. no impacted cerumen  Nose: Congestion present. No mucosal edema, rhinorrhea or nasal deformity. No epistaxis. Right sinus exhibits maxillary sinus tenderness. Right sinus exhibits no frontal sinus tenderness. Left sinus exhibits maxillary sinus tenderness. Left sinus exhibits no frontal sinus tenderness.   Mouth/Throat: Uvula is midline, oropharynx is clear and moist and mucous membranes are normal. Mucous membranes are moist. No trismus in the jaw. Normal dentition. No uvula swelling. No oropharyngeal exudate or posterior oropharyngeal erythema. Oropharynx is clear.   Eyes: Conjunctivae and lids are normal. Pupils are equal, round, and reactive to light. Right eye exhibits no discharge. Left eye exhibits no discharge. No scleral icterus.   Neck: Trachea normal and phonation normal. Neck supple. No neck rigidity present.   Cardiovascular: Regular rhythm, normal heart sounds and normal pulses. Bradycardia  present.   Pulmonary/Chest: Effort normal and breath sounds normal. No respiratory distress. She has no wheezes. She has no rhonchi. She has no rales.   Abdominal: Normal appearance and bowel sounds are normal. She exhibits no distension. Soft. There is no abdominal tenderness.   Musculoskeletal: Normal range of motion.         General: Normal range of motion.      Cervical back: She exhibits no tenderness.   Lymphadenopathy:     She has no cervical adenopathy.   Neurological: She is alert and oriented to person, place, and time. She exhibits normal muscle tone.   Skin: Skin is warm, dry, intact, not diaphoretic, not pale and no rash. Capillary refill takes less than 2 seconds. No erythema   Psychiatric: Her speech is normal and behavior is normal. Judgment and thought content normal.   Nursing note and vitals reviewed.      Assessment:     1. Acute otitis media, left    2. Acute non-recurrent maxillary sinusitis        Plan:       Acute otitis media, left    Acute non-recurrent maxillary sinusitis    Other orders  -     amoxicillin-clavulanate 875-125mg (AUGMENTIN) 875-125 mg per tablet; Take 1 tablet by mouth every 12 (twelve) hours. for 10 days  Dispense: 20 tablet; Refill: 0  -     loratadine-pseudoephedrine  mg (CLARITIN-D 24 HOUR)  mg per 24 hr tablet; Take 1 tablet by mouth once daily. for 10 days  -     fluticasone propionate (FLONASE) 50 mcg/actuation nasal spray; 2 sprays (100 mcg total) by Each Nostril route once daily.  Dispense: 15.8 mL; Refill: 0                Take medications as prescribed.    Tylenol/Motrin per package instructions for any pain or fever.    Nasal saline flushes or irrigation as directed.    Assure adequate hydration.    Follow-up with PCP in 1-2 days.    Return to clinic as needed.    To ED for any new or acutely worsening symptoms.    Patient in agreement with plan of care.    DISCLAIMER: Please note that my documentation in this Electronic Healthcare Record was  produced using speech recognition software and therefore may contain errors related to that software system.These could include grammar, punctuation and spelling errors or the inclusion/exclusion of phrases that were not intended. Garbled syntax, mangled pronouns, and other bizarre constructions may be attributed to that software system.

## 2025-01-11 NOTE — PROGRESS NOTES
SUBJECTIVE:    Patient ID: Viji Elder is a 44 y.o. female.    Chief Complaint: Annual Exam (Annual exam//no med bottles//mammogram ordered for after 2/13/25//declined flu vac//patient has been fasting for over 12 hours and would like labs ordered//tc)    Pt here for annual exam.       Her right hip hurts every now and then when stands up a lot, more than 10 hours.  Works at a lab, for long hours. Hurts on the greater trochanter.     Allergies are doing ok right now. Has not been using flonase.     Has a  fatty tissue over her right scapula that is bothersome at times, but not to the point that she would like to do surgery.     Acne is about the same. Has been dealing with it. Thinks it is hormonal as it mostly in the jaw are.   Has been having occasional hot flashes, doesn't keep her up at night.     Denies any issues with constipation or diarrhea.    Not having any bladder issues.     Sleep is good.     Denies heartburn.      ------------------------------------------------------------------  Mammogram 1/2024  Pap 2024 (Darinel)  Refuse flu vaccine.   Cscope 2/2024, 1 polyp, (tSuaud) to repeat in 5 years.         Office Visit on 08/21/2024   Component Date Value Ref Range Status    POC Preg Test, Ur 08/21/2024 Negative  Negative Final     Acceptable 08/21/2024 Yes   Final    POC Blood, Urine 08/21/2024 Negative  Negative Final    POC Bilirubin, Urine 08/21/2024 Negative  Negative Final    POC Urobilinogen, Urine 08/21/2024 0.2  0.1 - 1.1 Final    POC Ketones, Urine 08/21/2024 Negative  Negative Final    POC Protein, Urine 08/21/2024 Negative  Negative Final    POC Nitrates, Urine 08/21/2024 Negative  Negative Final    POC Glucose, Urine 08/21/2024 Negative  Negative Final    pH, UA 08/21/2024 6.0   Final    POC Specific Gravity, Urine 08/21/2024 1.015  1.003 - 1.029 Final    POC Leukocytes, Urine 08/21/2024 Positive (A)  Negative Final    Urine Culture, Routine 08/21/2024 Final report  (A)   Final    Result 1 2024 Comment (A)   Final    Antimicrobial Susceptibility 2024 Comment   Final   Clinical Support on 2024   Component Date Value Ref Range Status    POC Blood, Urine 2024 Negative  Negative Final    POC Bilirubin, Urine 2024 Negative  Negative Final    POC Urobilinogen, Urine 2024 Normal  0.1 - 1.1 Final    POC Ketones, Urine 2024 Negative  Negative Final    POC Protein, Urine 2024 Negative  Negative Final    POC Nitrates, Urine 2024 Negative  Negative Final    POC Glucose, Urine 2024 Negative  Negative Final    pH, UA 2024 6.0   Final    POC Specific Gravity, Urine 2024 1.015  1.003 - 1.029 Final    POC Leukocytes, Urine 2024 Negative  Negative Final    Urine Culture, Routine 2024 SEE COMMENT   Final       Past Medical History:   Diagnosis Date    Personal history of colonic polyps 2024    Matthew Acosta MD     Social History     Socioeconomic History    Marital status:    Tobacco Use    Smoking status: Never     Passive exposure: Never    Smokeless tobacco: Never   Substance and Sexual Activity    Alcohol use: Not Currently    Drug use: Never    Sexual activity: Yes     Partners: Male     Birth control/protection: None     Past Surgical History:   Procedure Laterality Date     SECTION WITH TUBAL LIGATION      1994& 2010(BTL)    COLONOSCOPY  2024    Matthew Acosta MD     Family History   Problem Relation Name Age of Onset    Hypertension Mother      Cancer Father          prostate cancer    No Known Problems Sister      Eczema Daughter          2 daughters    Breast cancer Paternal Aunt      No Known Problems Brother      Eczema Son         Review of patient's allergies indicates:  No Known Allergies    Current Outpatient Medications:     fluticasone propionate (FLONASE) 50 mcg/actuation nasal spray, 2 sprays (100 mcg total) by Each Nostril route once daily., Disp: 15.8 mL, Rfl:  "0    multivitamin (THERAGRAN) per tablet, Take 1 tablet by mouth once daily., Disp: , Rfl:     multivitamin with minerals tablet, Take 1 tablet by mouth once daily., Disp: , Rfl:     Review of Systems   Constitutional:  Negative for appetite change, fatigue, fever and unexpected weight change.   Respiratory:  Negative for cough, chest tightness, shortness of breath and wheezing.    Cardiovascular:  Negative for chest pain and leg swelling.   Gastrointestinal:  Negative for abdominal pain, constipation, nausea and vomiting.        -heartburn   Genitourinary:  Negative for difficulty urinating, dysuria, frequency and urgency.   Musculoskeletal:  Positive for arthralgias. Negative for back pain, myalgias and neck pain.   Skin:  Negative for rash.        +acne, right scapular mass   Allergic/Immunologic: Negative for environmental allergies.   Neurological:  Negative for dizziness, weakness, numbness and headaches.   Hematological:  Does not bruise/bleed easily.   Psychiatric/Behavioral:  Negative for dysphoric mood, sleep disturbance and suicidal ideas. The patient is not nervous/anxious.    All other systems reviewed and are negative.         Objective:      Vitals:    01/13/25 0827   BP: 96/60   Temp: (!) 60 °F (15.6 °C)   SpO2: 100%   Weight: 54.4 kg (120 lb)   Height: 5' 3" (1.6 m)         Physical Exam  Vitals reviewed.   Constitutional:       General: She is not in acute distress.     Appearance: Normal appearance. She is well-developed.   HENT:      Head: Normocephalic and atraumatic.   Neck:      Thyroid: No thyromegaly.   Cardiovascular:      Rate and Rhythm: Normal rate and regular rhythm.      Heart sounds: Normal heart sounds. No murmur heard.     No friction rub.   Pulmonary:      Effort: Pulmonary effort is normal.      Breath sounds: Normal breath sounds. No wheezing or rales.   Abdominal:      General: Bowel sounds are normal. There is no distension.      Palpations: Abdomen is soft.      Tenderness: " There is no abdominal tenderness.   Musculoskeletal:      Cervical back: Neck supple.   Lymphadenopathy:      Cervical: No cervical adenopathy.   Skin:     General: Skin is warm and dry.      Findings: No rash.          Neurological:      Mental Status: She is alert and oriented to person, place, and time.   Psychiatric:         Attention and Perception: She is attentive.         Speech: Speech normal.         Behavior: Behavior normal.         Thought Content: Thought content normal.         Judgment: Judgment normal.           Assessment:       1. Annual physical exam    2. Lipoma of torso    3. Long-term use of high-risk medication    4. Other screening mammogram             Plan:       Annual physical exam    Lipoma of torso  Comments:  Stable. Will continue to monitor    Long-term use of high-risk medication  -     TSH w/reflex to FT4; Future; Expected date: 01/13/2025  -     Urinalysis, Reflex to Urine Culture Urine, Clean Catch; Future; Expected date: 01/13/2025  -     CBC Auto Differential; Future; Expected date: 01/13/2025  -     Lipid Panel; Future; Expected date: 01/13/2025  -     Comprehensive Metabolic Panel; Future; Expected date: 01/13/2025    Other screening mammogram  -     Mammo Digital Screening Bilat w/ Matt; Future; Expected date: 02/13/2025        Labs and/or tests have been ordered for the evaluation/monitoring of acute/chronic conditions, to be done just before next visit.    Counseled on age and gender appropriate medical preventative services, including cancer screenings, immunizations, overall nutritional health, need for a consistent exercise regimen and an overall push towards maintaining a vigorous and active lifestyle    Follow up in about 1 year (around 1/13/2026) for Annual, LABS.        1/13/2025 Karla Marques

## 2025-01-13 ENCOUNTER — OFFICE VISIT (OUTPATIENT)
Dept: FAMILY MEDICINE | Facility: CLINIC | Age: 45
End: 2025-01-13
Payer: OTHER GOVERNMENT

## 2025-01-13 VITALS
DIASTOLIC BLOOD PRESSURE: 60 MMHG | BODY MASS INDEX: 21.26 KG/M2 | SYSTOLIC BLOOD PRESSURE: 96 MMHG | OXYGEN SATURATION: 100 % | WEIGHT: 120 LBS | HEIGHT: 63 IN | TEMPERATURE: 60 F

## 2025-01-13 DIAGNOSIS — Z79.899 LONG-TERM USE OF HIGH-RISK MEDICATION: ICD-10-CM

## 2025-01-13 DIAGNOSIS — D17.1 LIPOMA OF TORSO: ICD-10-CM

## 2025-01-13 DIAGNOSIS — Z00.00 ANNUAL PHYSICAL EXAM: Primary | ICD-10-CM

## 2025-01-13 DIAGNOSIS — Z12.31 OTHER SCREENING MAMMOGRAM: ICD-10-CM

## 2025-01-13 PROCEDURE — 99396 PREV VISIT EST AGE 40-64: CPT | Mod: S$GLB,,, | Performed by: FAMILY MEDICINE

## 2025-01-14 LAB
ALBUMIN SERPL-MCNC: 4.2 G/DL (ref 3.6–5.1)
ALBUMIN/GLOB SERPL: 1.6 (CALC) (ref 1–2.5)
ALP SERPL-CCNC: 53 U/L (ref 31–125)
ALT SERPL-CCNC: 10 U/L (ref 6–29)
APPEARANCE UR: CLEAR
AST SERPL-CCNC: 17 U/L (ref 10–35)
BACTERIA #/AREA URNS HPF: ABNORMAL /HPF
BACTERIA UR CULT: ABNORMAL
BASOPHILS # BLD AUTO: 49 CELLS/UL (ref 0–200)
BASOPHILS NFR BLD AUTO: 0.7 %
BILIRUB SERPL-MCNC: 0.5 MG/DL (ref 0.2–1.2)
BILIRUB UR QL STRIP: NEGATIVE
BUN SERPL-MCNC: 14 MG/DL (ref 7–25)
BUN/CREAT SERPL: NORMAL (CALC) (ref 6–22)
CALCIUM SERPL-MCNC: 9 MG/DL (ref 8.6–10.2)
CHLORIDE SERPL-SCNC: 105 MMOL/L (ref 98–110)
CHOLEST SERPL-MCNC: 188 MG/DL
CHOLEST/HDLC SERPL: 2.8 (CALC)
CO2 SERPL-SCNC: 29 MMOL/L (ref 20–32)
COLOR UR: YELLOW
CREAT SERPL-MCNC: 0.66 MG/DL (ref 0.5–0.99)
EGFR: 109 ML/MIN/1.73M2
EOSINOPHIL # BLD AUTO: 98 CELLS/UL (ref 15–500)
EOSINOPHIL NFR BLD AUTO: 1.4 %
ERYTHROCYTE [DISTWIDTH] IN BLOOD BY AUTOMATED COUNT: 12.4 % (ref 11–15)
GLOBULIN SER CALC-MCNC: 2.6 G/DL (CALC) (ref 1.9–3.7)
GLUCOSE SERPL-MCNC: 74 MG/DL (ref 65–99)
GLUCOSE UR QL STRIP: NEGATIVE
HCT VFR BLD AUTO: 37.6 % (ref 35–45)
HDLC SERPL-MCNC: 67 MG/DL
HGB BLD-MCNC: 12 G/DL (ref 11.7–15.5)
HGB UR QL STRIP: NEGATIVE
HYALINE CASTS #/AREA URNS LPF: ABNORMAL /LPF
KETONES UR QL STRIP: NEGATIVE
LDLC SERPL CALC-MCNC: 107 MG/DL (CALC)
LEUKOCYTE ESTERASE UR QL STRIP: NEGATIVE
LYMPHOCYTES # BLD AUTO: 1211 CELLS/UL (ref 850–3900)
LYMPHOCYTES NFR BLD AUTO: 17.3 %
MCH RBC QN AUTO: 28.3 PG (ref 27–33)
MCHC RBC AUTO-ENTMCNC: 31.9 G/DL (ref 32–36)
MCV RBC AUTO: 88.7 FL (ref 80–100)
MONOCYTES # BLD AUTO: 644 CELLS/UL (ref 200–950)
MONOCYTES NFR BLD AUTO: 9.2 %
NEUTROPHILS # BLD AUTO: 4998 CELLS/UL (ref 1500–7800)
NEUTROPHILS NFR BLD AUTO: 71.4 %
NITRITE UR QL STRIP: NEGATIVE
NONHDLC SERPL-MCNC: 121 MG/DL (CALC)
PH UR STRIP: 5.5 [PH] (ref 5–8)
PLATELET # BLD AUTO: 309 THOUSAND/UL (ref 140–400)
PMV BLD REES-ECKER: 10.8 FL (ref 7.5–12.5)
POTASSIUM SERPL-SCNC: 4.4 MMOL/L (ref 3.5–5.3)
PROT SERPL-MCNC: 6.8 G/DL (ref 6.1–8.1)
PROT UR QL STRIP: NEGATIVE
RBC # BLD AUTO: 4.24 MILLION/UL (ref 3.8–5.1)
RBC #/AREA URNS HPF: ABNORMAL /HPF
SERVICE CMNT-IMP: ABNORMAL
SODIUM SERPL-SCNC: 140 MMOL/L (ref 135–146)
SP GR UR STRIP: 1.01 (ref 1–1.03)
SQUAMOUS #/AREA URNS HPF: ABNORMAL /HPF
TRIGL SERPL-MCNC: 57 MG/DL
TSH SERPL-ACNC: 1.16 MIU/L
WBC # BLD AUTO: 7 THOUSAND/UL (ref 3.8–10.8)
WBC #/AREA URNS HPF: ABNORMAL /HPF

## 2025-02-17 ENCOUNTER — HOSPITAL ENCOUNTER (OUTPATIENT)
Dept: RADIOLOGY | Facility: HOSPITAL | Age: 45
Discharge: HOME | End: 2025-02-17
Attending: FAMILY MEDICINE
Payer: OTHER GOVERNMENT

## 2025-02-17 VITALS — HEIGHT: 63 IN | WEIGHT: 119.94 LBS | BODY MASS INDEX: 21.25 KG/M2

## 2025-02-17 DIAGNOSIS — Z12.31 OTHER SCREENING MAMMOGRAM: ICD-10-CM

## 2025-02-17 PROCEDURE — 77063 BREAST TOMOSYNTHESIS BI: CPT | Mod: TC,PO

## 2025-09-02 ENCOUNTER — TELEPHONE (OUTPATIENT)
Dept: FAMILY MEDICINE | Facility: CLINIC | Age: 45
End: 2025-09-02
Payer: OTHER GOVERNMENT

## 2025-09-02 DIAGNOSIS — H40.9 GLAUCOMA, UNSPECIFIED GLAUCOMA TYPE, UNSPECIFIED LATERALITY: ICD-10-CM

## 2025-09-02 DIAGNOSIS — Z01.00 EYE EXAM, ROUTINE: Primary | ICD-10-CM
